# Patient Record
Sex: MALE | Race: WHITE | Employment: FULL TIME | URBAN - METROPOLITAN AREA
[De-identification: names, ages, dates, MRNs, and addresses within clinical notes are randomized per-mention and may not be internally consistent; named-entity substitution may affect disease eponyms.]

---

## 2017-02-27 PROCEDURE — 84403 ASSAY OF TOTAL TESTOSTERONE: CPT | Performed by: INTERNAL MEDICINE

## 2017-12-18 PROCEDURE — 87081 CULTURE SCREEN ONLY: CPT | Performed by: EMERGENCY MEDICINE

## 2023-12-13 RX ORDER — IBUPROFEN 400 MG/1
400 TABLET ORAL EVERY 6 HOURS PRN
COMMUNITY
End: 2023-12-19

## 2023-12-13 RX ORDER — ASPIRIN 81 MG/1
81 TABLET ORAL DAILY
COMMUNITY
End: 2023-12-19

## 2023-12-13 RX ORDER — PANTOPRAZOLE SODIUM 40 MG/1
40 TABLET, DELAYED RELEASE ORAL
COMMUNITY

## 2023-12-13 RX ORDER — POLYETHYLENE GLYCOL 3350 17 G/17G
17 POWDER, FOR SOLUTION ORAL DAILY
COMMUNITY

## 2023-12-13 RX ORDER — ACETAMINOPHEN 325 MG/1
325 TABLET ORAL EVERY 6 HOURS PRN
COMMUNITY

## 2023-12-13 RX ORDER — TESTOSTERONE UNDECANOATE 112.5 MG/1
2 CAPSULE, LIQUID FILLED ORAL DAILY
COMMUNITY

## 2023-12-18 ENCOUNTER — ANESTHESIA EVENT (OUTPATIENT)
Dept: SURGERY | Facility: HOSPITAL | Age: 38
End: 2023-12-18
Payer: COMMERCIAL

## 2023-12-18 ENCOUNTER — ANESTHESIA (OUTPATIENT)
Dept: SURGERY | Facility: HOSPITAL | Age: 38
End: 2023-12-18
Payer: COMMERCIAL

## 2023-12-18 ENCOUNTER — HOSPITAL ENCOUNTER (INPATIENT)
Facility: HOSPITAL | Age: 38
LOS: 1 days | Discharge: HOME | DRG: 460 | End: 2023-12-19
Attending: ORTHOPAEDIC SURGERY | Admitting: ORTHOPAEDIC SURGERY
Payer: COMMERCIAL

## 2023-12-18 ENCOUNTER — APPOINTMENT (OUTPATIENT)
Dept: GENERAL RADIOLOGY | Facility: HOSPITAL | Age: 38
DRG: 460 | End: 2023-12-18
Attending: ORTHOPAEDIC SURGERY
Payer: COMMERCIAL

## 2023-12-18 ENCOUNTER — HOSPITAL ENCOUNTER (INPATIENT)
Facility: HOSPITAL | Age: 38
LOS: 1 days | Discharge: HOME | End: 2023-12-19
Attending: ORTHOPAEDIC SURGERY | Admitting: ORTHOPAEDIC SURGERY
Payer: COMMERCIAL

## 2023-12-18 ENCOUNTER — APPOINTMENT (OUTPATIENT)
Dept: GENERAL RADIOLOGY | Facility: HOSPITAL | Age: 38
End: 2023-12-18
Attending: ORTHOPAEDIC SURGERY
Payer: COMMERCIAL

## 2023-12-18 PROBLEM — Z98.1 STATUS POST LUMBAR AND LUMBOSACRAL FUSION BY ANTERIOR TECHNIQUE: Status: ACTIVE | Noted: 2023-12-18

## 2023-12-18 LAB
ANTIBODY SCREEN: NEGATIVE
RH BLOOD TYPE: NEGATIVE
RH BLOOD TYPE: NEGATIVE

## 2023-12-18 PROCEDURE — 0ST40ZZ RESECTION OF LUMBOSACRAL DISC, OPEN APPROACH: ICD-10-PCS | Performed by: ORTHOPAEDIC SURGERY

## 2023-12-18 PROCEDURE — 22558 ARTHRD ANT NTRBD MIN DSC LUM: CPT | Performed by: SURGERY

## 2023-12-18 PROCEDURE — 50715 RELEASE OF URETER: CPT | Performed by: SURGERY

## 2023-12-18 PROCEDURE — 76000 FLUOROSCOPY <1 HR PHYS/QHP: CPT | Performed by: ORTHOPAEDIC SURGERY

## 2023-12-18 PROCEDURE — 22845 INSERT SPINE FIXATION DEVICE: CPT | Performed by: SURGERY

## 2023-12-18 PROCEDURE — 22853 INSJ BIOMECHANICAL DEVICE: CPT | Performed by: SURGERY

## 2023-12-18 PROCEDURE — 0TN70ZZ RELEASE LEFT URETER, OPEN APPROACH: ICD-10-PCS | Performed by: SURGERY

## 2023-12-18 PROCEDURE — 0SG30A0 FUSION OF LUMBOSACRAL JOINT WITH INTERBODY FUSION DEVICE, ANTERIOR APPROACH, ANTERIOR COLUMN, OPEN APPROACH: ICD-10-PCS | Performed by: ORTHOPAEDIC SURGERY

## 2023-12-18 DEVICE — OSTEOCEL PRO BONE MATRIX MED: Type: IMPLANTABLE DEVICE | Site: ABDOMEN | Status: FUNCTIONAL

## 2023-12-18 DEVICE — COROENT XLR-F SCREW 5.5X25: Type: IMPLANTABLE DEVICE | Site: ABDOMEN | Status: FUNCTIONAL

## 2023-12-18 DEVICE — BONE GRAFT KIT 7510100 INFUSE X SMALL
Type: IMPLANTABLE DEVICE | Site: ABDOMEN | Status: FUNCTIONAL
Brand: INFUSE® BONE GRAFT

## 2023-12-18 DEVICE — BRIGADE IMPLANT 14X38X28 12DEG: Type: IMPLANTABLE DEVICE | Site: ABDOMEN | Status: FUNCTIONAL

## 2023-12-18 DEVICE — COROENT XLR-F SCREW 5.5X22.5: Type: IMPLANTABLE DEVICE | Site: ABDOMEN | Status: FUNCTIONAL

## 2023-12-18 RX ORDER — DOCUSATE SODIUM 100 MG/1
100 CAPSULE, LIQUID FILLED ORAL 2 TIMES DAILY
Status: DISCONTINUED | OUTPATIENT
Start: 2023-12-18 | End: 2023-12-19

## 2023-12-18 RX ORDER — MORPHINE SULFATE 10 MG/ML
6 INJECTION, SOLUTION INTRAMUSCULAR; INTRAVENOUS EVERY 10 MIN PRN
Status: DISCONTINUED | OUTPATIENT
Start: 2023-12-18 | End: 2023-12-18 | Stop reason: HOSPADM

## 2023-12-18 RX ORDER — FAMOTIDINE 20 MG/1
20 TABLET, FILM COATED ORAL ONCE
Status: DISCONTINUED | OUTPATIENT
Start: 2023-12-18 | End: 2023-12-18 | Stop reason: HOSPADM

## 2023-12-18 RX ORDER — DIPHENHYDRAMINE HCL 25 MG
25 CAPSULE ORAL EVERY 4 HOURS PRN
Status: DISCONTINUED | OUTPATIENT
Start: 2023-12-18 | End: 2023-12-19

## 2023-12-18 RX ORDER — HYDROMORPHONE HYDROCHLORIDE 1 MG/ML
0.4 INJECTION, SOLUTION INTRAMUSCULAR; INTRAVENOUS; SUBCUTANEOUS EVERY 2 HOUR PRN
Status: DISCONTINUED | OUTPATIENT
Start: 2023-12-18 | End: 2023-12-19

## 2023-12-18 RX ORDER — NALOXONE HYDROCHLORIDE 0.4 MG/ML
0.08 INJECTION, SOLUTION INTRAMUSCULAR; INTRAVENOUS; SUBCUTANEOUS AS NEEDED
Status: DISCONTINUED | OUTPATIENT
Start: 2023-12-18 | End: 2023-12-18 | Stop reason: HOSPADM

## 2023-12-18 RX ORDER — PROCHLORPERAZINE EDISYLATE 5 MG/ML
5 INJECTION INTRAMUSCULAR; INTRAVENOUS EVERY 8 HOURS PRN
Status: DISCONTINUED | OUTPATIENT
Start: 2023-12-18 | End: 2023-12-18 | Stop reason: HOSPADM

## 2023-12-18 RX ORDER — SODIUM CHLORIDE, SODIUM LACTATE, POTASSIUM CHLORIDE, CALCIUM CHLORIDE 600; 310; 30; 20 MG/100ML; MG/100ML; MG/100ML; MG/100ML
INJECTION, SOLUTION INTRAVENOUS CONTINUOUS
Status: DISCONTINUED | OUTPATIENT
Start: 2023-12-18 | End: 2023-12-19

## 2023-12-18 RX ORDER — CEFAZOLIN SODIUM/WATER 2 G/20 ML
2 SYRINGE (ML) INTRAVENOUS
Status: COMPLETED | OUTPATIENT
Start: 2023-12-18 | End: 2023-12-18

## 2023-12-18 RX ORDER — SENNOSIDES 8.6 MG
17.2 TABLET ORAL NIGHTLY
Status: DISCONTINUED | OUTPATIENT
Start: 2023-12-18 | End: 2023-12-19

## 2023-12-18 RX ORDER — MORPHINE SULFATE 4 MG/ML
4 INJECTION, SOLUTION INTRAMUSCULAR; INTRAVENOUS EVERY 10 MIN PRN
Status: DISCONTINUED | OUTPATIENT
Start: 2023-12-18 | End: 2023-12-18 | Stop reason: HOSPADM

## 2023-12-18 RX ORDER — TRANEXAMIC ACID 10 MG/ML
INJECTION, SOLUTION INTRAVENOUS AS NEEDED
Status: DISCONTINUED | OUTPATIENT
Start: 2023-12-18 | End: 2023-12-18 | Stop reason: SURG

## 2023-12-18 RX ORDER — FAMOTIDINE 10 MG/ML
20 INJECTION, SOLUTION INTRAVENOUS ONCE
Status: DISCONTINUED | OUTPATIENT
Start: 2023-12-18 | End: 2023-12-18 | Stop reason: HOSPADM

## 2023-12-18 RX ORDER — SODIUM CHLORIDE, SODIUM LACTATE, POTASSIUM CHLORIDE, CALCIUM CHLORIDE 600; 310; 30; 20 MG/100ML; MG/100ML; MG/100ML; MG/100ML
INJECTION, SOLUTION INTRAVENOUS CONTINUOUS
Status: DISCONTINUED | OUTPATIENT
Start: 2023-12-18 | End: 2023-12-18 | Stop reason: HOSPADM

## 2023-12-18 RX ORDER — POLYETHYLENE GLYCOL 3350 17 G/17G
17 POWDER, FOR SOLUTION ORAL DAILY
Status: DISCONTINUED | OUTPATIENT
Start: 2023-12-18 | End: 2023-12-19

## 2023-12-18 RX ORDER — CEFAZOLIN SODIUM/WATER 2 G/20 ML
2 SYRINGE (ML) INTRAVENOUS EVERY 8 HOURS
Qty: 40 ML | Refills: 0 | Status: COMPLETED | OUTPATIENT
Start: 2023-12-18 | End: 2023-12-19

## 2023-12-18 RX ORDER — OXYCODONE HYDROCHLORIDE AND ACETAMINOPHEN 5; 325 MG/1; MG/1
2 TABLET ORAL EVERY 4 HOURS PRN
Status: DISCONTINUED | OUTPATIENT
Start: 2023-12-18 | End: 2023-12-19

## 2023-12-18 RX ORDER — HYDROMORPHONE HYDROCHLORIDE 1 MG/ML
0.6 INJECTION, SOLUTION INTRAMUSCULAR; INTRAVENOUS; SUBCUTANEOUS EVERY 5 MIN PRN
Status: DISCONTINUED | OUTPATIENT
Start: 2023-12-18 | End: 2023-12-18 | Stop reason: HOSPADM

## 2023-12-18 RX ORDER — PROCHLORPERAZINE EDISYLATE 5 MG/ML
5 INJECTION INTRAMUSCULAR; INTRAVENOUS EVERY 8 HOURS PRN
Status: DISCONTINUED | OUTPATIENT
Start: 2023-12-18 | End: 2023-12-19

## 2023-12-18 RX ORDER — DEXAMETHASONE SODIUM PHOSPHATE 4 MG/ML
VIAL (ML) INJECTION AS NEEDED
Status: DISCONTINUED | OUTPATIENT
Start: 2023-12-18 | End: 2023-12-18 | Stop reason: SURG

## 2023-12-18 RX ORDER — OXYCODONE HYDROCHLORIDE AND ACETAMINOPHEN 5; 325 MG/1; MG/1
1 TABLET ORAL EVERY 4 HOURS PRN
Status: DISCONTINUED | OUTPATIENT
Start: 2023-12-18 | End: 2023-12-19

## 2023-12-18 RX ORDER — HYDROMORPHONE HYDROCHLORIDE 1 MG/ML
0.8 INJECTION, SOLUTION INTRAMUSCULAR; INTRAVENOUS; SUBCUTANEOUS EVERY 2 HOUR PRN
Status: DISCONTINUED | OUTPATIENT
Start: 2023-12-18 | End: 2023-12-19

## 2023-12-18 RX ORDER — SODIUM CHLORIDE 9 MG/ML
INJECTION, SOLUTION INTRAVENOUS CONTINUOUS PRN
Status: DISCONTINUED | OUTPATIENT
Start: 2023-12-18 | End: 2023-12-18 | Stop reason: SURG

## 2023-12-18 RX ORDER — ACETAMINOPHEN 500 MG
1000 TABLET ORAL ONCE
Status: COMPLETED | OUTPATIENT
Start: 2023-12-18 | End: 2023-12-18

## 2023-12-18 RX ORDER — ENEMA 19; 7 G/133ML; G/133ML
1 ENEMA RECTAL ONCE AS NEEDED
Status: DISCONTINUED | OUTPATIENT
Start: 2023-12-18 | End: 2023-12-19

## 2023-12-18 RX ORDER — POLYETHYLENE GLYCOL 3350 17 G/17G
17 POWDER, FOR SOLUTION ORAL DAILY PRN
Status: DISCONTINUED | OUTPATIENT
Start: 2023-12-18 | End: 2023-12-19

## 2023-12-18 RX ORDER — BUPIVACAINE HYDROCHLORIDE AND EPINEPHRINE 5; 5 MG/ML; UG/ML
INJECTION, SOLUTION PERINEURAL AS NEEDED
Status: DISCONTINUED | OUTPATIENT
Start: 2023-12-18 | End: 2023-12-18 | Stop reason: HOSPADM

## 2023-12-18 RX ORDER — HYDROMORPHONE HYDROCHLORIDE 1 MG/ML
0.2 INJECTION, SOLUTION INTRAMUSCULAR; INTRAVENOUS; SUBCUTANEOUS EVERY 5 MIN PRN
Status: DISCONTINUED | OUTPATIENT
Start: 2023-12-18 | End: 2023-12-18 | Stop reason: HOSPADM

## 2023-12-18 RX ORDER — METOCLOPRAMIDE HYDROCHLORIDE 5 MG/ML
10 INJECTION INTRAMUSCULAR; INTRAVENOUS ONCE
Status: COMPLETED | OUTPATIENT
Start: 2023-12-18 | End: 2023-12-18

## 2023-12-18 RX ORDER — CYCLOBENZAPRINE HCL 5 MG
5 TABLET ORAL 3 TIMES DAILY
Status: DISCONTINUED | OUTPATIENT
Start: 2023-12-18 | End: 2023-12-19

## 2023-12-18 RX ORDER — SODIUM CHLORIDE 9 MG/ML
INJECTION, SOLUTION INTRAVENOUS CONTINUOUS
Status: DISCONTINUED | OUTPATIENT
Start: 2023-12-18 | End: 2023-12-19

## 2023-12-18 RX ORDER — PANTOPRAZOLE SODIUM 40 MG/1
40 TABLET, DELAYED RELEASE ORAL
Status: DISCONTINUED | OUTPATIENT
Start: 2023-12-19 | End: 2023-12-19

## 2023-12-18 RX ORDER — ONDANSETRON 2 MG/ML
4 INJECTION INTRAMUSCULAR; INTRAVENOUS EVERY 6 HOURS PRN
Status: DISCONTINUED | OUTPATIENT
Start: 2023-12-18 | End: 2023-12-18 | Stop reason: HOSPADM

## 2023-12-18 RX ORDER — MORPHINE SULFATE 4 MG/ML
2 INJECTION, SOLUTION INTRAMUSCULAR; INTRAVENOUS EVERY 10 MIN PRN
Status: DISCONTINUED | OUTPATIENT
Start: 2023-12-18 | End: 2023-12-18 | Stop reason: HOSPADM

## 2023-12-18 RX ORDER — LIDOCAINE HYDROCHLORIDE 10 MG/ML
INJECTION, SOLUTION EPIDURAL; INFILTRATION; INTRACAUDAL; PERINEURAL AS NEEDED
Status: DISCONTINUED | OUTPATIENT
Start: 2023-12-18 | End: 2023-12-18

## 2023-12-18 RX ORDER — ROCURONIUM BROMIDE 10 MG/ML
INJECTION, SOLUTION INTRAVENOUS AS NEEDED
Status: DISCONTINUED | OUTPATIENT
Start: 2023-12-18 | End: 2023-12-18 | Stop reason: SURG

## 2023-12-18 RX ORDER — HYDROMORPHONE HYDROCHLORIDE 1 MG/ML
0.4 INJECTION, SOLUTION INTRAMUSCULAR; INTRAVENOUS; SUBCUTANEOUS EVERY 5 MIN PRN
Status: DISCONTINUED | OUTPATIENT
Start: 2023-12-18 | End: 2023-12-18 | Stop reason: HOSPADM

## 2023-12-18 RX ORDER — DIPHENHYDRAMINE HYDROCHLORIDE 50 MG/ML
25 INJECTION INTRAMUSCULAR; INTRAVENOUS EVERY 4 HOURS PRN
Status: DISCONTINUED | OUTPATIENT
Start: 2023-12-18 | End: 2023-12-19

## 2023-12-18 RX ORDER — ONDANSETRON 2 MG/ML
4 INJECTION INTRAMUSCULAR; INTRAVENOUS EVERY 6 HOURS PRN
Status: DISCONTINUED | OUTPATIENT
Start: 2023-12-18 | End: 2023-12-19

## 2023-12-18 RX ORDER — METOCLOPRAMIDE 10 MG/1
10 TABLET ORAL ONCE
Status: COMPLETED | OUTPATIENT
Start: 2023-12-18 | End: 2023-12-18

## 2023-12-18 RX ORDER — ONDANSETRON 2 MG/ML
INJECTION INTRAMUSCULAR; INTRAVENOUS AS NEEDED
Status: DISCONTINUED | OUTPATIENT
Start: 2023-12-18 | End: 2023-12-18 | Stop reason: SURG

## 2023-12-18 RX ORDER — MIDAZOLAM HYDROCHLORIDE 1 MG/ML
INJECTION INTRAMUSCULAR; INTRAVENOUS AS NEEDED
Status: DISCONTINUED | OUTPATIENT
Start: 2023-12-18 | End: 2023-12-18 | Stop reason: SURG

## 2023-12-18 RX ORDER — BISACODYL 10 MG
10 SUPPOSITORY, RECTAL RECTAL
Status: DISCONTINUED | OUTPATIENT
Start: 2023-12-18 | End: 2023-12-19

## 2023-12-18 RX ADMIN — SODIUM CHLORIDE, SODIUM LACTATE, POTASSIUM CHLORIDE, CALCIUM CHLORIDE: 600; 310; 30; 20 INJECTION, SOLUTION INTRAVENOUS at 12:25:00

## 2023-12-18 RX ADMIN — MIDAZOLAM HYDROCHLORIDE 2 MG: 1 INJECTION INTRAMUSCULAR; INTRAVENOUS at 12:26:00

## 2023-12-18 RX ADMIN — TRANEXAMIC ACID 1000 MG: 10 INJECTION, SOLUTION INTRAVENOUS at 12:38:00

## 2023-12-18 RX ADMIN — CEFAZOLIN SODIUM/WATER 2 G: 2 G/20 ML SYRINGE (ML) INTRAVENOUS at 12:46:52

## 2023-12-18 RX ADMIN — DEXAMETHASONE SODIUM PHOSPHATE 8 MG: 4 MG/ML VIAL (ML) INJECTION at 12:55:00

## 2023-12-18 RX ADMIN — ROCURONIUM BROMIDE 20 MG: 10 INJECTION, SOLUTION INTRAVENOUS at 13:53:00

## 2023-12-18 RX ADMIN — SODIUM CHLORIDE: 9 INJECTION, SOLUTION INTRAVENOUS at 14:15:00

## 2023-12-18 RX ADMIN — ONDANSETRON 4 MG: 2 INJECTION INTRAMUSCULAR; INTRAVENOUS at 14:05:00

## 2023-12-18 RX ADMIN — ROCURONIUM BROMIDE 20 MG: 10 INJECTION, SOLUTION INTRAVENOUS at 13:00:00

## 2023-12-18 RX ADMIN — ROCURONIUM BROMIDE 50 MG: 10 INJECTION, SOLUTION INTRAVENOUS at 12:30:00

## 2023-12-18 RX ADMIN — SODIUM CHLORIDE: 9 INJECTION, SOLUTION INTRAVENOUS at 12:36:00

## 2023-12-18 RX ADMIN — SODIUM CHLORIDE, SODIUM LACTATE, POTASSIUM CHLORIDE, CALCIUM CHLORIDE: 600; 310; 30; 20 INJECTION, SOLUTION INTRAVENOUS at 12:15:00

## 2023-12-18 NOTE — BRIEF OP NOTE
Pre-Operative Diagnosis: Low back pain not otherwise specified, lumbar radiculopathy, degeneration of lumbar intervertebral disc, lumbosacral spondylosis, lumbar spondylosis, prolapsed lumbar intervertebral disc, lumbosacral radiculopathy     Post-Operative Diagnosis: Low back pain not otherwise specified, lumbar radiculopathy, degeneration of lumbar intervertebral disc, lumbosacral spondylosis, lumbar spondylosis, prolapsed lumbar intervertebral disc, lumbosacral radiculopathy      Procedure Performed:   L5 - S1 anterior lumbar interbody fusion, use of allograft bone    Surgeon(s) and Role:     Brisa Oconnell MD - Primary     * Danette Victor MD - Assisting Surgeon    Assistant(s):  Surgical Assistant.: Lisa Millard CSA  PA: Sharmin Vo PA-C     Surgical Findings: See Op Note     Estimated Blood Loss: Terry U. 47. Carmina Ruelas PA-C  12/18/2023  2:29 PM

## 2023-12-18 NOTE — H&P
History & Physical Examination    Patient Name: Waldemar Griffith  MRN: [id number]  [facility]: [id number]  YOB: 1985    Diagnosis: lumbar stenosis, lumbar radiculopathy    Present Illness: Plan for procedure: L5-S1 anterior lumbar interbody fusion with possible posterior instrumentation, use of allograft bone    Medications Prior to Admission   Medication Sig Dispense Refill Last Dose    pantoprazole 40 MG Oral Tab EC Take 1 tablet (40 mg total) by mouth 2 (two) times daily before meals. 12/18/2023 at 0800    aspirin 81 MG Oral Tab EC Take 1 tablet (81 mg total) by mouth daily. 12/3/2023    Testosterone Undecanoate (TLANDO) 112.5 MG Oral Cap Take 2 tablets by mouth daily. 12/17/2023 at 1900    Ascorbic Acid (VITAMIN C OR) Take 1 tablet by mouth daily. Past Week    Ergocalciferol (VITAMIN D OR) Take 1 tablet by mouth daily. Past Week    acetaminophen 325 MG Oral Tab Take 1 tablet (325 mg total) by mouth every 6 (six) hours as needed for Pain. 12/15/2023    ibuprofen 400 MG Oral Tab Take 1 tablet (400 mg total) by mouth every 6 (six) hours as needed for Pain. 12/10/2023    Testosterone Enanthate 200 MG/ML Intramuscular Solution Inject 400 mg into the muscle every 21 days. (Patient taking differently: Inject 400 mg into the muscle once a week.) 5 mL 5 12/16/2023    polyethylene glycol, PEG 3350, 17 g Oral Powd Pack Take 17 g by mouth daily.    Unknown    Syringe/Needle, Disp, (BD LUER-SID SYRINGE) 18G X 1-1/2\" 3 ML Does not apply Misc USE 1 TO DRAW UP TESTOSTERONE FOR INJECTION EVERY 3 WEEKS 10 each 1     Needle, Disp, (BD DISP NEEDLE) 25G X 1\" Does not apply Misc USE TO INJECT TESTOSTERONE EVERY 3 WEEKS 10 each 0      Current Facility-Administered Medications   Medication Dose Route Frequency    lactated ringers infusion   Intravenous Continuous    famotidine (Pepcid) tab 20 mg  20 mg Oral Once    Or    famotidine (Pepcid) 20 mg/2mL injection 20 mg  20 mg Intravenous Once    ceFAZolin (Ancef) 2 g in 20mL IV syringe premix  2 g Intravenous 27 Min Pre-Op       Allergies: No Known Allergies    Past Medical History:   Diagnosis Date    Back problem     Esophageal reflux     Hx of motion sickness     Sleep apnea     CPAP    Visual impairment     glasses     Past Surgical History:   Procedure Laterality Date    WISDOM TEETH REMOVED       Family History   Problem Relation Age of Onset    Cancer Father     Diabetes Father     Hypertension Father     Cancer Mother      Social History     Tobacco Use    Smoking status: Never    Smokeless tobacco: Never   Substance Use Topics    Alcohol use: Not Currently     Comment: 1 weekly       SYSTEM Check if Review is Normal Check if Physical Exam is Normal If not normal, please explain:   HEENT [X] [ ]    NECK & BACK Carina.Maggie ] [ ]    HEART Carina.Maggie ] [ ]    LUNGS Carina.Maggie ] [ ]    ABDOMEN Carina.Maggie ] [ ]    UROGENITAL Carina.Maggie ] [ ]    Elida Stevens [ ] [ ]    OTHER        [ x ] I have discussed the risks and benefits and alternatives with the patient/family. They understand and agree to proceed with plan of care. [ x ] I have reviewed the History and Physical done within the last 30 days. Any changes noted above.     Padmini Patel PA-C  12/18/2023  11:52 AM

## 2023-12-18 NOTE — ANESTHESIA PROCEDURE NOTES
Airway  Date/Time: 12/18/2023 12:49 PM  Urgency: elective      General Information and Staff    Patient location during procedure: OR  Anesthesiologist: Gina Medrano MD  Resident/CRNA: Gopi Lema CRNA  Performed: CRNA   Performed by: oGpi Lema CRNA  Authorized by: Gina Medrano MD      Indications and Patient Condition  Indications for airway management: airway protection and anesthesia  Spontaneous Ventilation: absent  Sedation level: deep  Preoxygenated: yes  Patient position: sniffing  Mask difficulty assessment: 1 - vent by mask    Final Airway Details  Final airway type: endotracheal airway      Successful airway: ETT  Cuffed: yes   Successful intubation technique: direct laryngoscopy  Endotracheal tube insertion site: oral  Blade: Kim  Blade size: #4  ETT size (mm): 7.5    Cormack-Lehane Classification: grade IIA - partial view of glottis  Measured from: teeth  ETT to teeth (cm): 23  Number of attempts at approach: 1  Ventilation between attempts: none  Number of other approaches attempted: 0

## 2023-12-18 NOTE — ANESTHESIA POSTPROCEDURE EVALUATION
Patient: Earl Browne    Procedure Summary       Date: 12/18/23 Room / Location: [address] [place] [address] MAIN OR 12 / [address] [place] [address] MAIN OR    Anesthesia Start: 1912 Anesthesia Stop:     Procedure: L5 - S1 anterior lumbar interbody fusion (Spine Lumbar) Diagnosis: (Low back pain not otherwise specified, lumbar radiculopathy, degeneration of lumbar intervertebral disc, lumbosacral spondylosis, lumbar spondylosis, prolapsed lumbar intervertebral disc, lumbosacral radiculopathy)    Surgeons: Mirta Headley MD Anesthesiologist: Lisa Singh MD    Anesthesia Type: general ASA Status: 2            Anesthesia Type: general    Vitals Value Taken Time   /70 12/18/23 1435   Temp 98.4 12/18/23 1439   Pulse 87 12/18/23 1438   Resp 15 12/18/23 1436   SpO2 97 % 12/18/23 1438   Vitals shown include unfiled device data.     [address] [place] [address] AN Post Evaluation:   Patient Evaluated in PACU  Patient Participation: complete - patient participated  Level of Consciousness: awake and alert  Pain Score: 0  Pain Management: adequate  Airway Patency:patent  Dental exam unchanged from preop    Cardiovascular Status: acceptable  Respiratory Status: acceptable  Postoperative Hydration acceptable  Comments: Report to [facility]      Stacie Larry CRNA  12/18/2023 2:39 PM

## 2023-12-19 VITALS
HEIGHT: 72 IN | SYSTOLIC BLOOD PRESSURE: 136 MMHG | DIASTOLIC BLOOD PRESSURE: 65 MMHG | BODY MASS INDEX: 31.83 KG/M2 | WEIGHT: 235 LBS | OXYGEN SATURATION: 97 % | RESPIRATION RATE: 18 BRPM | TEMPERATURE: 98 F | HEART RATE: 90 BPM

## 2023-12-19 LAB
ANION GAP SERPL CALC-SCNC: 4.2 MMOL/L (ref 0–18)
BUN BLD-MCNC: 9 MG/DL (ref 9–23)
BUN/CREAT SERPL: 7.1 (ref 10–20)
CALCIUM BLD-MCNC: 9 MG/DL (ref 8.7–10.4)
CHLORIDE SERPL-SCNC: 105 MMOL/L (ref 98–112)
CO2 SERPL-SCNC: 28.8 MMOL/L (ref 21–32)
CREAT BLD-MCNC: 1.27 MG/DL
EGFRCR SERPLBLD CKD-EPI 2021: 74 ML/MIN/1.73M2 (ref 60–?)
GLUCOSE BLD-MCNC: 114 MG/DL (ref 70–99)
HCT VFR BLD AUTO: 43.4 %
HGB BLD-MCNC: 13.9 G/DL
OSMOLALITY SERPL CALC.SUM OF ELEC: 286 MOSM/KG (ref 275–295)
POTASSIUM SERPL-SCNC: 4.2 MMOL/L (ref 3.5–5.1)
SODIUM SERPL-SCNC: 138 MMOL/L (ref 136–145)

## 2023-12-19 RX ORDER — PSEUDOEPHEDRINE HCL 30 MG
100 TABLET ORAL 2 TIMES DAILY
Status: SHIPPED | COMMUNITY
Start: 2023-12-19

## 2023-12-19 RX ORDER — CYCLOBENZAPRINE HCL 5 MG
5 TABLET ORAL 3 TIMES DAILY
Status: SHIPPED | COMMUNITY
Start: 2023-12-19

## 2023-12-19 RX ORDER — OXYCODONE HYDROCHLORIDE AND ACETAMINOPHEN 5; 325 MG/1; MG/1
2 TABLET ORAL EVERY 4 HOURS PRN
Status: SHIPPED | COMMUNITY
Start: 2023-12-19

## 2023-12-19 RX ORDER — OXYCODONE HYDROCHLORIDE AND ACETAMINOPHEN 5; 325 MG/1; MG/1
1 TABLET ORAL EVERY 4 HOURS PRN
Status: SHIPPED | COMMUNITY
Start: 2023-12-19

## 2023-12-19 NOTE — DISCHARGE INSTRUCTIONS
NO lifting over 15 pounds or rang e of motion of the lumbar spine  Lumbar brace on at all times  May shower 48 hours after surgery with incision covered  Daily dressing changes to start on POD 2 12/20/23 or upon discharge    Make follow up appointments-call sooner with any questions or concerns  Be aware of any fall hazards at home-such as loose rugs/cords-keep your pathways clear  Use rolling walker at all times until told otherwise by your surgeon  See handout regarding signs and symptoms of infection

## 2023-12-19 NOTE — PHYSICAL THERAPY NOTE
PHYSICAL THERAPY EVALUATION - INPATIENT     Room Number: 408/408-A  Evaluation Date: 12/19/2023  Type of Evaluation: Initial   Physician Order: PT Eval and Treat    Presenting Problem: L5-S1 fusion     Reason for Therapy: Mobility Dysfunction and Discharge Planning    PHYSICAL THERAPY ASSESSMENT     Patient is a 45year old male admitted 12/18/2023 for L5-S1 fusion. Patient's current functional deficits include bed mobility, transfers, and gait, which are below the patient's pre-admission status. Patient was independent prior to surgery. Functional Mobility:  - discussed bending/lifting/twisting restrictions and body mechanics, brace application  - bed mobility: supine to/from sit NT, verbal review of log roll mechanics    - transfers: sit to/from stand transfers with SBA from chair with arms   - standing balance supervision with RW    - gait: ambulation with supervision with  ft    - 12 steps ascending/descending with step to mechanics     At end of session patient in chair with all needs in reach, RN aware. The patient's Approx Degree of Impairment: 28.97% has been calculated based on documentation in the [facility] '6 clicks' Inpatient Basic Mobility Short Form. Research supports that patients with this level of impairment may benefit from home no needs. Patient will benefit from continued IP PT services to address these deficits in preparation for discharge. DISCHARGE RECOMMENDATIONS  PT Discharge Recommendations: Home;Outpatient PT (OPPT when cleared by surgical team)    PLAN  PT Treatment Plan: Bed mobility; Body mechanics; Endurance; Energy conservation;Patient education; Family education;Gait training;Range of motion;Stoop training;Transfer training;Stair training;Balance training  Rehab Potential : Good  Frequency (Obs): 5x/week       PHYSICAL THERAPY MEDICAL/SOCIAL HISTORY     History related to current admission: radicular pain R LE      Problem List  Active Problems:    Status post lumbar and lumbosacral fusion by anterior technique      HOME SITUATION  Home Situation  Type of Home: House  Home Layout: Two level; Able to live on main level (plans to sleep on recliner on first floor)  Stairs to Bedroom: 14 (7+7 with landing)  Railing: Yes  Lives With: Spouse (dtrs age 15 and 15, dog)     Prior Level of [place]: independent     SUBJECTIVE  \"I feel ok today. \"     PHYSICAL THERAPY EXAMINATION     OBJECTIVE  Precautions: Bed/chair alarm;Lumbar brace  Fall Risk: Standard fall risk    WEIGHT BEARING RESTRICTION      No restrictions           PAIN ASSESSMENT     Location: mild surgical unrated       COGNITION  Overall Cognitive Status:  WFL - within functional limits    BALANCE  Static Sitting: Normal  Dynamic Sitting: Good  Static Standing: Good  Dynamic Standing: Fair +    AM-PAC '6-Clicks' INPATIENT SHORT FORM - BASIC MOBILITY  How much difficulty does the patient currently have. .. Patient Difficulty: Turning over in bed (including adjusting bedclothes, sheets and blankets)?: None   Patient Difficulty: Sitting down on and standing up from a chair with arms (e.g., wheelchair, bedside commode, etc.): None   Patient Difficulty: Moving from lying on back to sitting on the side of the bed?: None   How much help from another person does the patient currently need. ..    Help from Another: Moving to and from a bed to a chair (including a wheelchair)?: A Little   Help from Another: Need to walk in hospital room?: A Little   Help from Another: Climbing 3-5 steps with a railing?: A Little     AM-PAC Score:  Raw Score: 21   Approx Degree of Impairment: 28.97%   Standardized Score (AM-PAC Scale): 50.25   CMS Modifier (G-Code): CJ    FUNCTIONAL ABILITY STATUS  Functional Mobility/Gait Assessment  Gait Assistance: Supervision  Distance (ft): 500  Assistive Device: Rolling walker  Pattern: Within Functional Limits  Stairs: Stairs  How Many Stairs: 12  Device: 1 Rail  Assist: Supervision  Pattern: Ascend and Descend  Ascend and Descend : Step to    Bed Mobility: NT     Transfers: SBA    Exercise/Education Provided: Body mechanics  Functional activity tolerated  Gait training  Posture  Transfer training    Patient End of Session: Up in chair;Needs met;Call light within reach;RN aware of session/findings; All patient questions and concerns addressed; Discussed recommendations with /; Family present    CURRENT GOALS    Goals to be met by: 1/15  Patient Goal Patient's self-stated goal is: to go home    Goal #1 Patient is able to demonstrate supine - sit EOB @ level: independent     Goal #1   Current Status    Goal #2 Patient is able to demonstrate transfers Sit to/from Stand at assistance level: independent with none     Goal #2  Current Status    Goal #3 Patient is able to ambulate 500 feet with assist device: none at assistance level: independent   Goal #3   Current Status    Goal #4 Patient will negotiate 12 stairs/one curb w/ assistive device and supervision   Goal #4   Current Status    Goal #5 Patient to demonstrate independence with home activity/exercise instructions provided to patient in preparation for discharge.    Goal #5   Current Status    Goal #6    Goal #6  Current Status      Patient Evaluation Complexity Level:  History Moderate - 1 or 2 personal factors and/or co-morbidities   Examination of body systems Moderate - addressing a total of 3 or more elements   Clinical Presentation Moderate - Evolving   Clinical Decision Making Moderate Complexity       Gait Training: 15 minutes  Therapeutic Activity: 9 minutes

## 2023-12-19 NOTE — PLAN OF CARE
Belinda Heath is from home with his spouse. Alert and oriented x 4 . Pod 1 anterior spinal sx. Cms wnl. Incision to abdomen c/d/I-afebrile with no s/s infection. Chuckie diet-bowel sounds present, passing flatus and belching, voiding s/p rivera removal, cleared by PT/OT-Rolling walker dispensed to patient. Spinal and fall precautions in place. Flexeril/percocet/repositioning for pain control, scd/kira, home self care-refer to dc summary  Problem: Patient Centered Care  Goal: Patient preferences are identified and integrated in the patient's plan of care  Description: Interventions:  - What would you like us to know as we care for you?  I live at home with my wife  - Provide timely, complete, and accurate information to patient/family  - Incorporate patient and family knowledge, values, beliefs, and cultural backgrounds into the planning and delivery of care  - Encourage patient/family to participate in care and decision-making at the level they choose  - Honor patient and family perspectives and choices  Outcome: Adequate for Discharge     Problem: Patient/Family Goals  Goal: Patient/Family Long Term Goal  Description: Patient's Long Term Goal: return to baseline adls    Interventions:  - pt/ot  Sw dc planning  Pain control  - See additional Care Plan goals for specific interventions  Outcome: Adequate for Discharge  Goal: Patient/Family Short Term Goal  Description: Patient's Short Term Goal: home today    Interventions:   - vs/labs wnl  Chuckie diet  Voiding  Clear pt/ot  Pain controlled  - See additional Care Plan goals for specific interventions  Outcome: Adequate for Discharge     Problem: PAIN - ADULT  Goal: Verbalizes/displays adequate comfort level or patient's stated pain goal  Description: INTERVENTIONS:  - Encourage pt to monitor pain and request assistance  - Assess pain using appropriate pain scale  - Administer analgesics based on type and severity of pain and evaluate response  - Implement non-pharmacological measures as appropriate and evaluate response  - Consider cultural and social influences on pain and pain management  - Manage/alleviate anxiety  - Utilize distraction and/or relaxation techniques  - Monitor for opioid side effects  - Notify MD/LIP if interventions unsuccessful or patient reports new pain  - Anticipate increased pain with activity and pre-medicate as appropriate  Outcome: Adequate for Discharge     Problem: RISK FOR INFECTION - ADULT  Goal: Absence of fever/infection during anticipated neutropenic period  Description: INTERVENTIONS  - Monitor WBC  - Administer growth factors as ordered  - Implement neutropenic guidelines  Outcome: Adequate for Discharge     Problem: SAFETY ADULT - FALL  Goal: Free from fall injury  Description: INTERVENTIONS:  - Assess pt frequently for physical needs  - Identify cognitive and physical deficits and behaviors that affect risk of falls.   - [place] fall precautions as indicated by assessment.  - Educate pt/family on patient safety including physical limitations  - Instruct pt to call for assistance with activity based on assessment  - Modify environment to reduce risk of injury  - Provide assistive devices as appropriate  - Consider OT/PT consult to assist with strengthening/mobility  - Encourage toileting schedule  Outcome: Adequate for Discharge     Problem: DISCHARGE PLANNING  Goal: Discharge to home or other facility with appropriate resources  Description: INTERVENTIONS:  - Identify barriers to discharge w/pt and caregiver  - Include patient/family/discharge partner in discharge planning  - Arrange for needed discharge resources and transportation as appropriate  - Identify discharge learning needs (meds, wound care, etc)  - Arrange for interpreters to assist at discharge as needed  - Consider post-discharge preferences of patient/family/discharge partner  - Complete POLST form as appropriate  - Assess patient's ability to be responsible for managing their own health  - Refer to Case Management Department for coordinating discharge planning if the patient needs post-hospital services based on physician/LIP order or complex needs related to functional status, cognitive ability or social support system  Outcome: Adequate for Discharge

## 2023-12-19 NOTE — PROGRESS NOTES
Ortho Spine Progress Note      No C/O of CP SOB NV. Pain controlled on Percocet, minimal leg pain or back pain todauy. Leg symptoms improved. Wife at bedside and walking with PT this am.  AVSS  Dressing is CDI  Motor to bilateral LE at baseline 5/5 throughout  NV intact.  Distal pulses are palpable with good cap refill  Homans neg    Lab Results       Component                Value               Date                       HGB                      13.9                12/19/2023                 HCT                      43.4                12/19/2023                 CREATSERUM               1.27                12/19/2023                 BUN                      9                   12/19/2023                 NA                       138                 12/19/2023                 K                        4.2                 12/19/2023                 CL                       105                 12/19/2023                 CO2                      28.8                12/19/2023                 GLU                      114                 12/19/2023                 CA                       9.0                 12/19/2023                S/p L5-S1 ALIF by Dr. Giulia Liu POD1    Mobilize  PT/OT  NO lifting over 15 pounds or ROM of the lumbar spine  Lumbar brace on at all times  May shower 48 hours after surgery with incision covered  Daily dressing changes to start on POD 2 or upon discharge  Please send patient home with gauze and tape  Rx meds in the chart  Liu out, urinated already  On CLD presently, advance to regular if bowel sounds present and passing gas  May d/c to home from ortho standpoint once tolerates regular diet  Please change dressing before the patient leaves    Follow up with Dr. Giulia Liu in office in 2 weeks      Luz Estrella PA-C  PA with Dr. Silke Cardenas

## 2023-12-19 NOTE — PLAN OF CARE
Patient AOX4. Clear liquid diet. Tele monitoring. Teds and Scds for dvt prophylaxis. Liu in place. PRN percocet given for pain management. LSO brace on. Wife at bedside. Safety precautions in place. Call light within reach,    Problem: Patient Centered Care  Goal: Patient preferences are identified and integrated in the patient's plan of care  Description: Interventions:  - What would you like us to know as we care for you?  Patient at bedside Edwina  - Provide timely, complete, and accurate information to patient/family  - Incorporate patient and family knowledge, values, beliefs, and cultural backgrounds into the planning and delivery of care  - Encourage patient/family to participate in care and decision-making at the level they choose  - Honor patient and family perspectives and choices  Outcome: Progressing     Problem: Patient/Family Goals  Goal: Patient/Family Long Term Goal  Description: Patient's Long Term Goal:     Interventions:  -   - See additional Care Plan goals for specific interventions  Outcome: Progressing  Goal: Patient/Family Short Term Goal  Description: Patient's Short Term Goal:     Interventions:   -   - See additional Care Plan goals for specific interventions  Outcome: Progressing

## 2023-12-19 NOTE — PLAN OF CARE
Pt is POD #0-1. Vital signs within normal limits. PRN Percocet provided for pain. Alert and oriented x4. CMS intact. On room air. CPAP HS. Remote tele in place, NSR. Tolerating clear liquids. Passing gas. Dressing clean, dry, and intact. Up with one assist and the walker. SCDs and TEDs for DVT prophylaxis. Appropriate safety precautions maintained. Bed locked in lowest position, call light within reach, and frequent rounding maintained. Plan to go home pending PT/OT eval, diet advancement.     Problem: Patient Centered Care  Goal: Patient preferences are identified and integrated in the patient's plan of care  Description: Interventions:  - What would you like us to know as we care for you?   - Provide timely, complete, and accurate information to patient/family  - Incorporate patient and family knowledge, values, beliefs, and cultural backgrounds into the planning and delivery of care  - Encourage patient/family to participate in care and decision-making at the level they choose  - Honor patient and family perspectives and choices  Outcome: Progressing     Problem: PAIN - ADULT  Goal: Verbalizes/displays adequate comfort level or patient's stated pain goal  Description: INTERVENTIONS:  - Encourage pt to monitor pain and request assistance  - Assess pain using appropriate pain scale  - Administer analgesics based on type and severity of pain and evaluate response  - Implement non-pharmacological measures as appropriate and evaluate response  - Consider cultural and social influences on pain and pain management  - Manage/alleviate anxiety  - Utilize distraction and/or relaxation techniques  - Monitor for opioid side effects  - Notify MD/LIP if interventions unsuccessful or patient reports new pain  - Anticipate increased pain with activity and pre-medicate as appropriate  Outcome: Progressing     Problem: RISK FOR INFECTION - ADULT  Goal: Absence of fever/infection during anticipated neutropenic period  Description: INTERVENTIONS  - Monitor WBC  - Administer growth factors as ordered  - Implement neutropenic guidelines  Outcome: Progressing     Problem: SAFETY ADULT - FALL  Goal: Free from fall injury  Description: INTERVENTIONS:  - Assess pt frequently for physical needs  - Identify cognitive and physical deficits and behaviors that affect risk of falls.   - [place] fall precautions as indicated by assessment.  - Educate pt/family on patient safety including physical limitations  - Instruct pt to call for assistance with activity based on assessment  - Modify environment to reduce risk of injury  - Provide assistive devices as appropriate  - Consider OT/PT consult to assist with strengthening/mobility  - Encourage toileting schedule  Outcome: Progressing     Problem: DISCHARGE PLANNING  Goal: Discharge to home or other facility with appropriate resources  Description: INTERVENTIONS:  - Identify barriers to discharge w/pt and caregiver  - Include patient/family/discharge partner in discharge planning  - Arrange for needed discharge resources and transportation as appropriate  - Identify discharge learning needs (meds, wound care, etc)  - Arrange for interpreters to assist at discharge as needed  - Consider post-discharge preferences of patient/family/discharge partner  - Complete POLST form as appropriate  - Assess patient's ability to be responsible for managing their own health  - Refer to Case Management Department for coordinating discharge planning if the patient needs post-hospital services based on physician/LIP order or complex needs related to functional status, cognitive ability or social support system  Outcome: Progressing

## 2023-12-19 NOTE — OPERATIVE REPORT
[facility]     Operative Report    Patient Name:  Garth Navarro  MR:  [id number]  :  1985  DOS:  23    Preop Dx: Low back pain not otherwise specified, lumbar radiculopathy, degeneration of lumbar intervertebral disc, lumbosacral spondylosis, lumbar spondylosis, prolapsed lumbar intervertebral disc, lumbosacral radiculopathy  Postop Dx: Low back pain not otherwise specified, lumbar radiculopathy, degeneration of lumbar intervertebral disc, lumbosacral spondylosis, lumbar spondylosis, prolapsed lumbar intervertebral disc, lumbosacral radiculopathy    Procedure:    Anterior lumbar interbody fusion L5 to S1 (40829-73)  Anterior lumbar instrumentation (24060-33)  Anterior insertion of interbody biomechanical device (55351-16)  Ureterolysis (92929)  Real-time intraoperative C-arm fluoroscopy with image guidance and surgeon interpretation (63887)    Surgeons:  Anai Sahu MD, Jaime Sigala MD  Surgical Assistant.: Pavan Hernandez CSA  PA: Dre Monk PA-C, Giuliana Vaca MD  EBL: 50 ml  Complications:  None. Description:    The patient is a 45year old male with Low back pain not otherwise specified, lumbar radiculopathy, degeneration of lumbar intervertebral disc, lumbosacral spondylosis, lumbar spondylosis, prolapsed lumbar intervertebral disc, lumbosacral radiculopathy who is undergoing an ALIF with Dr. Jaime Sigala. General surgery was asked to assist in exposing the anterior disc space. The patient was taken to the OR and placed in supine position. General anesthesia was established and the abdomen was prepped in standard fashion. Fluoroscopy was used to aniceto the L5-S1 level. A transverse incision was made over the left lower quadrant. The anterior left rectus fascia was incised and the left rectus muscle was mobilized medially. A vertical incision was made in the tranversalis fascia and the retroperitoneum was entered.   The peritoneum and left ureter were identified and mobilized medially. Left ureterolysis was performed safely and no injury occurred. I was able to mobilize the left ureter to the right of the disc space. Just medial to the left iliac vein, we dissected the prevertebral space. The median sacral vessels were divided to further expose the L5-S1 disc space. The retractors were placed to expose the anterior disc space. Fluoroscopy was used to confirm the L5-S1 disc level. The case was turned over to Dr. Aroldo Sheehan for the discectomy and implant placement. After fluoroscopic confirmation of the implant placement, the operative field was irrigated with saline. Adequate hemostasis was obtained. The retractors were slowly removed and the operative field remained hemostatic. There were no injury to the left ureter, peritoneal contents or the sympathetic chain during our dissection. A left transversus abdominus plane block was performed using 0.5% marcaine. The anterior fascia was closed using 0 looped PDS. We closed the subcutaneous tissue and skin. All instrument and sponge counts were correct. I was present to expose the anterior disc space.     Zoe Dunn MD

## 2023-12-26 NOTE — PAYOR COMM NOTE
Discharge Notification    Patient Name: Richie Templeton  Payor: Barby Dia #: [id number]  Authorization Number: [id number]  Admit Date/Time: 12/18/2023 9:45 AM  Discharge Date/Time: 12/19/2023 3:44 PM

## 2023-12-26 NOTE — OPERATIVE REPORT
PREOPERATIVE DIAGNOSIS:    1. Lumbar radiculopathy. 2.       Lumbar stenosis. 3.       Lumbar spondylolisthesis. POSTOPERATIVE DIAGNOSIS:    1. Lumbar radiculopathy. 2.       Lumbar stenosis. 3.       Lumbar spondylolisthesis. PROCEDURE:    1. Anterior lumbar interbody fusion at L5-S1.  2.       Use of PEEK interbody cage at L5-S1.  3.        instrumentation using screws into L5 and S1 to stabilize anterior lumbar interbody fusion implant. 4.       Use of allograft bone. 5.       Use of bone morphogenic protein. 6.       Use and interpretation of intraoperative fluoroscopy. 7.       Use of intraoperative neuromonitoring. CO-SURGEON:  Verna Weldon MD     ASSISTANT:  Riki Avelar PA-C. He assisted in positioning, prepping, draping, retracting, and wound closure. ANESTHESIA:  General endotracheal.     COMPLICATIONS:  None. ESTIMATED BLOOD LOSS:  Less than 50 mL. DEEP VEIN THROMBOSIS PROPHYLAXIS:  SCDs and TEDs to lower extremities. PREOPERATIVE ANTIBIOTICS:  Ancef. IMPLANTS:  [employer]. INDICATIONS:  The patient is a 29-year-old male with a history of low back pain, bilateral lower extremity radiculopathy that failed conservative management. AN MRI of the lumbar spine showed evidence of severe foraminal stenosis at L4-5 bilaterally and grade 1 spondylolisthesis. After he failed to improve with conservative management, I recommend the above-listed surgery. We explained to the patient that the risks of surgery include, but are not limited to, infection, nerve injury, vessel injury, vascular injury, deep vein thrombosis, pulmonary embolism, cardiac complications in the perioperative period, sensory deficit, motor deficit, the possibility he may not improve, the possibility that symptoms may become worse, anesthetic complications and death in the perioperative period. OPERATIVE TECHNIQUE:  The patient was seen preoperatively and surgical site was marked.   SCDs and TEDs were placed on lower extremities. He was brought to the OR and placed on a radiolucent bed in the supine position. After successful induction of anesthesia, the abdomen was prepped and draped in the usual sterile manner for a retroperitoneal approach to the L5-S1 segment. Dr. Miky Joyner performed the exposure. Please defer for to his operative report for the exposure part of the procedure. After localizing the segment and exposing it anteriorly, we used a needle to aniceto the midline. A knife was then used to create an annulotomy anteriorly. Curettes and pituitaries were then used to perform complete discectomy. The endplates were prepared for fusion. We removed the disc all the way back to the posterior longitudinal ligament. We trialed and elected to use a 38x14 mm cage that was 12-degree lordotic. We placed allograft bone as well as bone morphogenic protein in the cage and then inserted under fluoroscopic guidance. Once that was in good position, I placed 5.5 mm x 25 mm pedicle screws, 2 in S1 and 2 in L5, to secure the cage to the disc space. Once that was in good position as verified on AP and lateral fluoroscopy, we copiously irrigated the wound. Dr. Miky Joyner then closed the wound and skin. Please defer to his dictation regarding wound closure. I was present throughout the case. All counts were correct. No complications occurred during this procedure.

## 2024-03-12 ENCOUNTER — APPOINTMENT (OUTPATIENT)
Dept: URBAN - METROPOLITAN AREA CLINIC 241 | Age: 39
Setting detail: DERMATOLOGY
End: 2024-03-12

## 2024-03-12 DIAGNOSIS — L60.8 OTHER NAIL DISORDERS: ICD-10-CM

## 2024-03-12 PROCEDURE — 99202 OFFICE O/P NEW SF 15 MIN: CPT

## 2024-03-12 PROCEDURE — OTHER MIPS QUALITY: OTHER

## 2024-03-12 PROCEDURE — OTHER COUNSELING: OTHER

## 2024-03-12 PROCEDURE — OTHER ADDITIONAL NOTES: OTHER

## 2024-03-12 ASSESSMENT — LOCATION DETAILED DESCRIPTION DERM
LOCATION DETAILED: LEFT DORSAL GREAT TOE
LOCATION DETAILED: RIGHT MIDDLE FINGERNAIL
LOCATION DETAILED: RIGHT INDEX FINGERNAIL
LOCATION DETAILED: LEFT THUMBNAIL

## 2024-03-12 ASSESSMENT — LOCATION SIMPLE DESCRIPTION DERM
LOCATION SIMPLE: RIGHT INDEX FINGER
LOCATION SIMPLE: LEFT GREAT TOE
LOCATION SIMPLE: LEFT THUMB
LOCATION SIMPLE: RIGHT MIDDLE FINGER

## 2024-03-12 ASSESSMENT — LOCATION ZONE DERM
LOCATION ZONE: TOE
LOCATION ZONE: FINGERNAIL

## 2024-03-12 NOTE — PROCEDURE: ADDITIONAL NOTES
Detail Level: Simple
Render Risk Assessment In Note?: no
Additional Notes: -Advised pt that nails take a while to grow out.

## (undated) DEVICE — DRAPE SRG 90X60IN BCK TBL CVR

## (undated) DEVICE — DRAPE SHEET LG

## (undated) DEVICE — SUT VICRYL 0 27

## (undated) DEVICE — DRAPE SHEET LAPCHOLE 124X100X7

## (undated) DEVICE — PRONEVIEW CUSHION INSERTS LRG

## (undated) DEVICE — WRAP COOLING BACK W/NO PILLOW

## (undated) DEVICE — GOWN,SIRUS,NONREINF,RAGLAN,XL,STERILE: Brand: MEDLINE

## (undated) DEVICE — C-ARM: Brand: UNBRANDED

## (undated) DEVICE — GAMMEX® PI HYBRID SIZE 8, STERILE POWDER-FREE SURGICAL GLOVE, POLYISOPRENE AND NEOPRENE BLEND: Brand: GAMMEX

## (undated) DEVICE — GOWN SURG XL DISP LEV 3 AERO BLU RAGLAN SL

## (undated) DEVICE — LIGACLIP MCA MULTIPLE CLIP APPLIERS, 20 MEDIUM CLIPS: Brand: LIGACLIP

## (undated) DEVICE — LAMINECTOMY: Brand: MEDLINE INDUSTRIES, INC.

## (undated) DEVICE — SOLUTION IRRIG 1000ML 0.9% NACL USP BTL

## (undated) DEVICE — ADHESIVE SKIN TOP FOR WND CLSR DERMBND ADV

## (undated) DEVICE — PREMIERPRO LAP SPONGE 4"X18" STERILE, 5/PK: Brand: PREMIERPRO

## (undated) DEVICE — GAMMEX® NON-LATEX PI ORTHO SIZE 7, STERILE POLYISOPRENE POWDER-FREE SURGICAL GLOVE: Brand: GAMMEX

## (undated) DEVICE — GAUZE,SPONGE,4"X4",12PLY,WOVEN,NS,LF: Brand: MEDLINE

## (undated) DEVICE — DISPOSABLE SLIM BIPOLAR FORCEPS, NON-STICK,: Brand: SPETZLER-MALIS

## (undated) DEVICE — X-RAY DETECTABLE SPONGES,16 PLY: Brand: VISTEC

## (undated) DEVICE — KIT HEMSTAT MTRX 8ML PORCINE GEL HUM THROM

## (undated) DEVICE — SPONGE: SPECIALTY PEANUT XR 100/CS: Brand: MEDICAL ACTION INDUSTRIES

## (undated) DEVICE — GAMMEX® PI HYBRID SIZE 8.5, STERILE POWDER-FREE SURGICAL GLOVE, POLYISOPRENE AND NEOPRENE BLEND: Brand: GAMMEX

## (undated) DEVICE — BANDAGE COHESIVE W4INXL5YD TAN E POR SLF ADH

## (undated) DEVICE — BIPOLAR SEALER 23-112-1 AQM 6.0: Brand: AQUAMANTYS™

## (undated) DEVICE — DRAPE SRG U 124X80IN U REINF

## (undated) DEVICE — MONITORING NEUROPHYSIOLOGICAL

## (undated) DEVICE — 3.0MM PRECISION NEURO (MATCH HEAD)

## (undated) DEVICE — TRAY CATH 16FR F INCLUDE BARDX IC COMPLT CARE

## (undated) DEVICE — TUBING MEGADYNE SPECULUM

## (undated) DEVICE — SYRINGE BULB 50/CS 48/PLT: Brand: MEDEGEN MEDICAL PRODUCTS, LLC

## (undated) DEVICE — SUTURE PDS II SZ 0 L60IN ABSRB VLT L48MM CTX

## (undated) DEVICE — CATHETER URETH 20FR L16IN RED RUB ALL PURP .

## (undated) DEVICE — KIT SUR ACCS MAXCESS

## (undated) DEVICE — PENCIL ES BTTN SWCH W/ TIP HOLSTER E-Z CLN

## (undated) DEVICE — SUTURE VCRL SZ 2-0 L18IN ABSRB VLT L26MM CT-2

## (undated) DEVICE — INTENDED USE FOR SURGICAL MARKING ON INTACT SKIN, ALSO PROVIDES A PERMANENT METHOD OF IDENTIFYING OBJECTS IN THE OPERATING ROOM: Brand: WRITESITE® PLUS MINI PREP RESISTANT MARKER

## (undated) DEVICE — C-ARMOR C-ARM EQUIPMENT COVERS CLEAR STERILE UNIVERSAL FIT 12 PER CASE: Brand: C-ARMOR

## (undated) DEVICE — GAMMEX® NON-LATEX PI ORTHO SIZE 8, STERILE POLYISOPRENE POWDER-FREE SURGICAL GLOVE: Brand: GAMMEX

## (undated) NOTE — LETTER
[address]  Authorization for Surgical Operation and Procedure                                                                                           I hereby authorize Darwin Quan MD, Debra Jimenez MD, my physician and his/her assistants (if applicable), which may include medical students, residents, and/or fellows, to perform the following surgical operation/ procedure and administer such anesthesia as may be determined necessary by my physician: Operation/Procedure name (s) L5 - S1 anterior lumbar interbody fusion with possible posterior instrumentation and use of allograft bone on Elnoria Nip   2. I recognize that during the surgical operation/procedure, unforeseen conditions may necessitate additional or different procedures than those listed above. I, therefore, further authorize and request that the above-named surgeon, assistants, or designees perform such procedures as are, in their judgment, necessary and desirable. 3.   My surgeon/physician has discussed prior to my surgery the potential benefits, risks and side effects of this procedure; the likelihood of achieving goals; and potential problems that might occur during recuperation. They also discussed reasonable alternatives to the procedure, including risks, benefits, and side effects related to the alternatives and risks related to not receiving this procedure. I have had all my questions answered and I acknowledge that no guarantee has been made as to the result that may be obtained. 4.   Should the need arise during my operation/procedure, which includes change of level of care prior to discharge, I also consent to the administration of blood and/or blood products.   Further, I understand that despite careful testing and screening of blood or blood products by collecting agencies, I may still be subject to ill effects as a result of receiving a blood transfusion and/or blood products. The following are some, but not all, of the potential risks that can occur: fever and allergic reactions, hemolytic reactions, transmission of diseases such as Hepatitis, AIDS and Cytomegalovirus (CMV) and fluid overload. In the event that I wish to have an autologous transfusion of my own blood, or a directed donor transfusion, I will discuss this with my physician. Check only if Refusing Blood or Blood Products  I understand refusal of blood or blood products as deemed necessary by my physician may have serious consequences to my condition to include possible death. I hereby assume responsibility for my refusal and release the hospital, its personnel, and my physicians from any responsibility for the consequences of my refusal.    o  Refuse   5. I authorize the use of any specimen, organs, tissues, body parts or foreign objects that may be removed from my body during the operation/procedure for diagnosis, research or teaching purposes and their subsequent disposal by hospital authorities. I also authorize the release of specimen test results and/or written reports to my treating physician on the hospital medical staff or other referring or consulting physicians involved in my care, at the discretion of the Pathologist or my treating physician. 6.   I consent to the photographing or videotaping of the operations or procedures to be performed, including appropriate portions of my body for medical, scientific, or educational purposes, provided my identity is not revealed by the pictures or by descriptive texts accompanying them. If the procedure has been photographed/videotaped, the surgeon will obtain the original picture, image, videotape or CD.   The hospital will not be responsible for storage, release or maintenance of the picture, image, tape or CD.    7.   I consent to the presence of a  or observers in the operating room as deemed necessary by my physician or their designees. 8.   I recognize that in the event my procedure results in extended X-Ray/fluoroscopy time, I may develop a skin reaction. 9. If I have a Do Not Attempt Resuscitation (DNAR) order in place, that status will be suspended while in the operating room, procedural suite, and during the recovery period unless otherwise explicitly stated by me (or a person authorized to consent on my behalf). The surgeon or my attending physician will determine when the applicable recovery period ends for purposes of reinstating the DNAR order. 10. Patients having a sterilization procedure: I understand that if the procedure is successful the results will be permanent and it will therefore be impossible for me to inseminate, conceive, or bear children. I also understand that the procedure is intended to result in sterility, although the result has not been guaranteed. 11. I acknowledge that my physician has explained sedation/analgesia administration to me including the risk and benefits I consent to the administration of sedation/analgesia as may be necessary or desirable in the judgment of my physician.     I CERTIFY THAT I HAVE READ AND FULLY UNDERSTAND THE ABOVE CONSENT TO OPERATION and/or OTHER PROCEDURE.     _________________________________________ _________________________________     ___________________________________  Signature of Patient     Signature of Responsible Person                   Printed Name of Responsible Person                              _________________________________________ ______________________________        ___________________________________  Signature of Witness         Date  Time         Relationship to Patient    STATEMENT OF PHYSICIAN My signature below affirms that prior to the time of the procedure; I have explained to the patient and/or his/her legal representative, the risks and benefits involved in the proposed treatment and any reasonable alternative to the proposed treatment. I have also explained the risks and benefits involved in refusal of the proposed treatment and alternatives to the proposed treatment and have answered the patient's questions.  If I have a significant financial interest in a co-management agreement or a significant financial interest in any product or implant, or other significant relationship used in this procedure/surgery, I have disclosed this and had a discussion with my patient.     _______________________________________________________________ _____________________________  [facility] of Physician)                                                                                         (Date)                                   (Time)  Patient Name: Karina Shrestha    : 1985   Printed: 2023      Medical Record #: [id number]                                              Page 1 of 1